# Patient Record
Sex: FEMALE | Race: ASIAN | NOT HISPANIC OR LATINO | Employment: FULL TIME | URBAN - METROPOLITAN AREA
[De-identification: names, ages, dates, MRNs, and addresses within clinical notes are randomized per-mention and may not be internally consistent; named-entity substitution may affect disease eponyms.]

---

## 2021-06-10 ENCOUNTER — HOSPITAL ENCOUNTER (OUTPATIENT)
Dept: RADIOLOGY | Facility: MEDICAL CENTER | Age: 46
End: 2021-06-10
Attending: MIDWIFE
Payer: COMMERCIAL

## 2021-06-10 DIAGNOSIS — Z34.82 PRENATAL CARE, SUBSEQUENT PREGNANCY, SECOND TRIMESTER: ICD-10-CM

## 2021-06-10 PROCEDURE — 76805 OB US >/= 14 WKS SNGL FETUS: CPT

## 2022-04-24 ENCOUNTER — OFFICE VISIT (OUTPATIENT)
Dept: URGENT CARE | Facility: PHYSICIAN GROUP | Age: 47
End: 2022-04-24
Payer: COMMERCIAL

## 2022-04-24 VITALS
WEIGHT: 129.2 LBS | HEIGHT: 61 IN | DIASTOLIC BLOOD PRESSURE: 88 MMHG | HEART RATE: 106 BPM | SYSTOLIC BLOOD PRESSURE: 140 MMHG | TEMPERATURE: 98.9 F | OXYGEN SATURATION: 99 % | RESPIRATION RATE: 16 BRPM | BODY MASS INDEX: 24.39 KG/M2

## 2022-04-24 DIAGNOSIS — L21.9 SEBORRHEIC DERMATITIS: ICD-10-CM

## 2022-04-24 DIAGNOSIS — L08.9 INFECTION OF SCALP: ICD-10-CM

## 2022-04-24 PROCEDURE — 99203 OFFICE O/P NEW LOW 30 MIN: CPT | Performed by: PHYSICIAN ASSISTANT

## 2022-04-24 RX ORDER — MAGNESIUM GLYCINATE 100 MG
TABLET ORAL
COMMUNITY

## 2022-04-24 RX ORDER — SULFAMETHOXAZOLE AND TRIMETHOPRIM 800; 160 MG/1; MG/1
1 TABLET ORAL 2 TIMES DAILY
Qty: 10 TABLET | Refills: 0 | Status: SHIPPED | OUTPATIENT
Start: 2022-04-24 | End: 2022-04-29

## 2022-04-24 RX ORDER — VITAMIN E (DL,TOCOPHERYL ACET) 22.5 MG/ML
DROPS ORAL
COMMUNITY
End: 2022-05-28

## 2022-04-24 ASSESSMENT — ENCOUNTER SYMPTOMS
VOMITING: 0
NAUSEA: 0
FEVER: 0
DIZZINESS: 0
CHILLS: 0
HEADACHES: 0

## 2022-04-24 NOTE — PATIENT INSTRUCTIONS
Seborrheic Dermatitis, Adult  Seborrheic dermatitis is a skin disease that causes red, scaly patches. It usually occurs on the scalp, and it is often called dandruff. The patches may appear on other parts of the body. Skin patches tend to appear where there are many oil glands in the skin. Areas of the body that are commonly affected include:  · Scalp.  · Skin folds of the body.  · Ears.  · Eyebrows.  · Neck.  · Face.  · Armpits.  · The bearded area of men's faces.  The condition may come and go for no known reason, and it is often long-lasting (chronic).  What are the causes?  The cause of this condition is not known.  What increases the risk?  This condition is more likely to develop in people who:  · Have certain conditions, such as:  ? HIV (human immunodeficiency virus).  ? AIDS (acquired immunodeficiency syndrome).  ? Parkinson disease.  ? Mood disorders, such as depression.  · Are 40-60 years old.  What are the signs or symptoms?  Symptoms of this condition include:  · Thick scales on the scalp.  · Redness on the face or in the armpits.  · Skin that is flaky. The flakes may be white or yellow.  · Skin that seems oily or dry but is not helped with moisturizers.  · Itching or burning in the affected areas.  How is this diagnosed?  This condition is diagnosed with a medical history and physical exam. A sample of your skin may be tested (skin biopsy). You may need to see a skin specialist (dermatologist).  How is this treated?  There is no cure for this condition, but treatment can help to manage the symptoms. You may get treatment to remove scales, lower the risk of skin infection, and reduce swelling or itching. Treatment may include:  · Creams that reduce swelling and irritation (steroids).  · Creams that reduce skin yeast.  · Medicated shampoo, soaps, moisturizing creams, or ointments.  · Medicated moisturizing creams or ointments.  Follow these instructions at home:  · Apply over-the-counter and prescription  medicines only as told by your health care provider.  · Use any medicated shampoo, soaps, skin creams, or ointments only as told by your health care provider.  · Keep all follow-up visits as told by your health care provider. This is important.  Contact a health care provider if:  · Your symptoms do not improve with treatment.  · Your symptoms get worse.  · You have new symptoms.  This information is not intended to replace advice given to you by your health care provider. Make sure you discuss any questions you have with your health care provider.  Document Released: 12/18/2006 Document Revised: 11/30/2018 Document Reviewed: 04/06/2017  Elsevier Patient Education © 2020 [employer] Inc[employer]

## 2022-05-28 ENCOUNTER — OFFICE VISIT (OUTPATIENT)
Dept: URGENT CARE | Facility: PHYSICIAN GROUP | Age: 47
End: 2022-05-28
Payer: COMMERCIAL

## 2022-05-28 VITALS
WEIGHT: 129 LBS | SYSTOLIC BLOOD PRESSURE: 122 MMHG | RESPIRATION RATE: 12 BRPM | TEMPERATURE: 97.7 F | HEIGHT: 61 IN | DIASTOLIC BLOOD PRESSURE: 80 MMHG | OXYGEN SATURATION: 98 % | BODY MASS INDEX: 24.35 KG/M2 | HEART RATE: 87 BPM

## 2022-05-28 DIAGNOSIS — M77.8 TENDINITIS OF EXTENSOR TENDON OF RIGHT HAND: ICD-10-CM

## 2022-05-28 PROCEDURE — 99214 OFFICE O/P EST MOD 30 MIN: CPT | Performed by: PHYSICIAN ASSISTANT

## 2022-05-28 RX ORDER — PREDNISONE 10 MG/1
40 TABLET ORAL DAILY
Qty: 20 TABLET | Refills: 0 | Status: SHIPPED | OUTPATIENT
Start: 2022-05-28 | End: 2022-06-02

## 2022-05-28 ASSESSMENT — ENCOUNTER SYMPTOMS
NEUROLOGICAL NEGATIVE: 1
CONSTITUTIONAL NEGATIVE: 1

## 2022-05-28 NOTE — PROGRESS NOTES
"  Subjective:     Radha Marin  is a 46 y.o. female who presents for Finger Pain (R pointer , x 2 weeks, swollen knuckle, no injury )       She presents today with right first finger PIP joint pain has been ongoing for 2 weeks.  The symptoms started after she had been performing gardening activities.  She denies any specific injury or trauma to the area.  Denies any known puncture injury to the finger.  Currently experiencing some pain with gripping activities, this pain is present over the proximal phalanx.  There is swelling over the first PIP joint.  Denies numbness and tingling over the affected finger.  She has trialed 400 mg of ibuprofen x4 doses, without symptom improvement.  She is currently breast-feeding.       Review of Systems   Constitutional: Negative.    Musculoskeletal: Positive for joint pain.   Neurological: Negative.       Allergies   Allergen Reactions   • Amoxicillin Hives   • Penicillin G Hives     No past medical history on file.     Objective:   /80 (BP Location: Right arm, Patient Position: Sitting, BP Cuff Size: Adult)   Pulse 87   Temp 36.5 °C (97.7 °F) (Temporal)   Resp 12   Ht 1.549 m (5' 1\")   Wt 58.5 kg (129 lb)   SpO2 98%   BMI 24.37 kg/m²   Physical Exam  Vitals and nursing note reviewed.   Constitutional:       General: She is not in acute distress.     Appearance: She is not ill-appearing or toxic-appearing.   HENT:      Head: Normocephalic.      Nose: No rhinorrhea.   Eyes:      General: Scleral icterus present.      Conjunctiva/sclera: Conjunctivae normal.   Pulmonary:      Effort: Pulmonary effort is normal. No respiratory distress.      Breath sounds: No stridor.   Musculoskeletal:      Cervical back: Neck supple.      Comments: Mild tenderness to palpation over the right first PIP joint, right first proximal phalanx.  There is some pain with gripping of the right hand.  5 out of 5 strength of the hands, bilaterally.  Neurovascular sensation is intact.  Capillary " refill within normal limits over all digits.   Neurological:      Mental Status: She is alert and oriented to person, place, and time.   Psychiatric:         Mood and Affect: Mood normal.         Behavior: Behavior normal.         Thought Content: Thought content normal.         Judgment: Judgment normal.             Diagnostic testing: None    Assessment/Plan:     Encounter Diagnoses   Name Primary?   • Tendinitis of extensor tendon of right hand           Plan for care for today's complaint includes Over-the-counter medications and prednisone.  Due to patient breast-feeding this did affect the management strategy for this condition and limited the medications that could be prescribed to treat this problem.  I did instruct her on how to perform cross friction massage over the extensor tendon, this can be performed 3-4 times a day.  I did offer referral to sports medicine; however, the patient would like to hold off at this time.  If symptoms remain ongoing or worsen then she can return to the urgent care or follow-up with her primary care provider for reevaluation..  I did review [facility] website to ensure that prednisone is appropriate to be taken during breast-feeding.  Prescription for Prednisone. provided.    See AVS Instructions below for written guidance provided to patient on after-visit management and care in addition to our verbal discussion during the visit.    Please note that this dictation was created using voice recognition software. I have attempted to correct all errors, but there may be sound-alike, spelling, grammar and possibly content errors that I did not discover before finalizing the note.    Clarke Heard PA-C

## 2022-06-22 SDOH — HEALTH STABILITY: MENTAL HEALTH
STRESS IS WHEN SOMEONE FEELS TENSE, NERVOUS, ANXIOUS, OR CAN'T SLEEP AT NIGHT BECAUSE THEIR MIND IS TROUBLED. HOW STRESSED ARE YOU?: ONLY A LITTLE

## 2022-06-22 SDOH — ECONOMIC STABILITY: TRANSPORTATION INSECURITY
IN THE PAST 12 MONTHS, HAS LACK OF TRANSPORTATION KEPT YOU FROM MEETINGS, WORK, OR FROM GETTING THINGS NEEDED FOR DAILY LIVING?: NO

## 2022-06-22 SDOH — ECONOMIC STABILITY: HOUSING INSECURITY
IN THE LAST 12 MONTHS, WAS THERE A TIME WHEN YOU DID NOT HAVE A STEADY PLACE TO SLEEP OR SLEPT IN A SHELTER (INCLUDING NOW)?: NO

## 2022-06-22 SDOH — ECONOMIC STABILITY: HOUSING INSECURITY: IN THE LAST 12 MONTHS, HOW MANY PLACES HAVE YOU LIVED?: 1

## 2022-06-22 SDOH — ECONOMIC STABILITY: INCOME INSECURITY: IN THE LAST 12 MONTHS, WAS THERE A TIME WHEN YOU WERE NOT ABLE TO PAY THE MORTGAGE OR RENT ON TIME?: NO

## 2022-06-22 SDOH — ECONOMIC STABILITY: TRANSPORTATION INSECURITY
IN THE PAST 12 MONTHS, HAS THE LACK OF TRANSPORTATION KEPT YOU FROM MEDICAL APPOINTMENTS OR FROM GETTING MEDICATIONS?: NO

## 2022-06-22 SDOH — ECONOMIC STABILITY: FOOD INSECURITY: WITHIN THE PAST 12 MONTHS, YOU WORRIED THAT YOUR FOOD WOULD RUN OUT BEFORE YOU GOT MONEY TO BUY MORE.: NEVER TRUE

## 2022-06-22 SDOH — ECONOMIC STABILITY: INCOME INSECURITY: HOW HARD IS IT FOR YOU TO PAY FOR THE VERY BASICS LIKE FOOD, HOUSING, MEDICAL CARE, AND HEATING?: NOT HARD AT ALL

## 2022-06-22 SDOH — ECONOMIC STABILITY: TRANSPORTATION INSECURITY
IN THE PAST 12 MONTHS, HAS LACK OF RELIABLE TRANSPORTATION KEPT YOU FROM MEDICAL APPOINTMENTS, MEETINGS, WORK OR FROM GETTING THINGS NEEDED FOR DAILY LIVING?: NO

## 2022-06-22 SDOH — ECONOMIC STABILITY: FOOD INSECURITY: WITHIN THE PAST 12 MONTHS, THE FOOD YOU BOUGHT JUST DIDN'T LAST AND YOU DIDN'T HAVE MONEY TO GET MORE.: NEVER TRUE

## 2022-06-22 SDOH — HEALTH STABILITY: PHYSICAL HEALTH: ON AVERAGE, HOW MANY DAYS PER WEEK DO YOU ENGAGE IN MODERATE TO STRENUOUS EXERCISE (LIKE A BRISK WALK)?: 1 DAY

## 2022-06-22 SDOH — HEALTH STABILITY: PHYSICAL HEALTH: ON AVERAGE, HOW MANY MINUTES DO YOU ENGAGE IN EXERCISE AT THIS LEVEL?: 60 MIN

## 2022-06-22 ASSESSMENT — LIFESTYLE VARIABLES
HOW OFTEN DO YOU HAVE A DRINK CONTAINING ALCOHOL: 2-3 TIMES A WEEK
HOW OFTEN DO YOU HAVE SIX OR MORE DRINKS ON ONE OCCASION: NEVER
AUDIT-C TOTAL SCORE: 3
SKIP TO QUESTIONS 9-10: 1
HOW MANY STANDARD DRINKS CONTAINING ALCOHOL DO YOU HAVE ON A TYPICAL DAY: 1 OR 2

## 2022-06-22 ASSESSMENT — SOCIAL DETERMINANTS OF HEALTH (SDOH)
DO YOU BELONG TO ANY CLUBS OR ORGANIZATIONS SUCH AS CHURCH GROUPS UNIONS, FRATERNAL OR ATHLETIC GROUPS, OR SCHOOL GROUPS?: YES
HOW OFTEN DO YOU GET TOGETHER WITH FRIENDS OR RELATIVES?: ONCE A WEEK
HOW OFTEN DO YOU GET TOGETHER WITH FRIENDS OR RELATIVES?: ONCE A WEEK
WITHIN THE PAST 12 MONTHS, YOU WORRIED THAT YOUR FOOD WOULD RUN OUT BEFORE YOU GOT THE MONEY TO BUY MORE: NEVER TRUE
HOW OFTEN DO YOU ATTENT MEETINGS OF THE CLUB OR ORGANIZATION YOU BELONG TO?: MORE THAN 4 TIMES PER YEAR
HOW OFTEN DO YOU ATTENT MEETINGS OF THE CLUB OR ORGANIZATION YOU BELONG TO?: MORE THAN 4 TIMES PER YEAR
HOW OFTEN DO YOU HAVE SIX OR MORE DRINKS ON ONE OCCASION: NEVER
HOW OFTEN DO YOU ATTEND CHURCH OR RELIGIOUS SERVICES?: NEVER
HOW OFTEN DO YOU ATTEND CHURCH OR RELIGIOUS SERVICES?: NEVER
IN A TYPICAL WEEK, HOW MANY TIMES DO YOU TALK ON THE PHONE WITH FAMILY, FRIENDS, OR NEIGHBORS?: NEVER
HOW OFTEN DO YOU HAVE A DRINK CONTAINING ALCOHOL: 2-3 TIMES A WEEK
HOW HARD IS IT FOR YOU TO PAY FOR THE VERY BASICS LIKE FOOD, HOUSING, MEDICAL CARE, AND HEATING?: NOT HARD AT ALL
HOW MANY DRINKS CONTAINING ALCOHOL DO YOU HAVE ON A TYPICAL DAY WHEN YOU ARE DRINKING: 1 OR 2
DO YOU BELONG TO ANY CLUBS OR ORGANIZATIONS SUCH AS CHURCH GROUPS UNIONS, FRATERNAL OR ATHLETIC GROUPS, OR SCHOOL GROUPS?: YES
IN A TYPICAL WEEK, HOW MANY TIMES DO YOU TALK ON THE PHONE WITH FAMILY, FRIENDS, OR NEIGHBORS?: NEVER

## 2022-06-23 ENCOUNTER — OFFICE VISIT (OUTPATIENT)
Dept: MEDICAL GROUP | Facility: PHYSICIAN GROUP | Age: 47
End: 2022-06-23
Payer: COMMERCIAL

## 2022-06-23 VITALS
TEMPERATURE: 97.6 F | SYSTOLIC BLOOD PRESSURE: 120 MMHG | OXYGEN SATURATION: 97 % | BODY MASS INDEX: 24.77 KG/M2 | DIASTOLIC BLOOD PRESSURE: 68 MMHG | HEART RATE: 91 BPM | HEIGHT: 61 IN | WEIGHT: 131.2 LBS

## 2022-06-23 DIAGNOSIS — Z00.00 ENCOUNTER FOR MEDICAL EXAMINATION TO ESTABLISH CARE: ICD-10-CM

## 2022-06-23 DIAGNOSIS — L30.8 OTHER ECZEMA: ICD-10-CM

## 2022-06-23 DIAGNOSIS — Z12.31 ENCOUNTER FOR SCREENING MAMMOGRAM FOR BREAST CANCER: ICD-10-CM

## 2022-06-23 DIAGNOSIS — B00.9 HSV-2 (HERPES SIMPLEX VIRUS 2) INFECTION: ICD-10-CM

## 2022-06-23 PROBLEM — L30.9 ECZEMA: Status: ACTIVE | Noted: 2022-06-23

## 2022-06-23 PROCEDURE — 99213 OFFICE O/P EST LOW 20 MIN: CPT

## 2022-06-23 RX ORDER — ACYCLOVIR 400 MG/1
400 TABLET ORAL 2 TIMES DAILY
Qty: 10 TABLET | Refills: 0 | Status: SHIPPED | OUTPATIENT
Start: 2022-06-23

## 2022-06-23 ASSESSMENT — PATIENT HEALTH QUESTIONNAIRE - PHQ9: CLINICAL INTERPRETATION OF PHQ2 SCORE: 0

## 2022-06-23 NOTE — ASSESSMENT & PLAN NOTE
Chronic, controlled with medication.  Flare ups occur on neck, shoulders, arms. Occasinally legs.  She gets good relief from fluocinonide cream. Refill requested today. Refill provided.

## 2022-06-23 NOTE — ASSESSMENT & PLAN NOTE
Chronic, controlled.  She can't recall the last time she had a flare up but sometimes has pain and is interested in getting a prescription in case she does have a flare up.  Mid-wife prescribed acyclovir in the past. Refill provided today for use as needed.

## 2022-06-23 NOTE — PROGRESS NOTES
CC:   Chief Complaint   Patient presents with   • Establish Care       HPI:  Radha is a pleasant 46 y.o. female here today to establish care.  Her previous PCP is Sheridan Salamanca from [facility].  Patient does not routinely follow with primary care.  She has received medications for eczema and HSV-2 from her midwife.  She is here today to complete prep healthcare maintenance screenings and get refills for her chronic conditions.    Patient Active Problem List   Diagnosis   • Eczema   • HSV-2 (herpes simplex virus 2) infection       Current Outpatient Medications   Medication Sig Dispense Refill   • LECITHIN PO Take  by mouth.     • FENUGREEK PO Take  by mouth.     • acyclovir (ZOVIRAX) 400 MG tablet Take 1 Tablet by mouth 2 times a day. 10 Tablet 0   • fluocinonide (LIDEX) 0.05 % Cream Apply 1 Application topically 2 times a day. 15 g 1   • vitamin D (VITAMIND D3) 1000 UNIT Tab Take 1,000 Units by mouth every day.     • Magnesium Bisglycinate (MAG GLYCINATE) 100 MG Tab Take  by mouth.       No current facility-administered medications for this visit.       Allergies as of 06/23/2022 - Reviewed 06/23/2022   Allergen Reaction Noted   • Amoxicillin Hives 02/18/2015   • Penicillin g Hives 01/13/2014        Social History     Socioeconomic History   • Marital status:      Spouse name: Not on file   • Number of children: Not on file   • Years of education: Not on file   • Highest education level: Bachelor's degree (e.g., BA, AB, BS)   Occupational History   • Not on file   Tobacco Use   • Smoking status: Never Smoker   • Smokeless tobacco: Never Used   Vaping Use   • Vaping Use: Never used   Substance and Sexual Activity   • Alcohol use: Yes     Alcohol/week: 1.2 oz     Types: 1 Glasses of wine, 1 Cans of beer per week   • Drug use: Never   • Sexual activity: Not on file   Other Topics Concern   • Not on file   Social History Narrative   • Not on file     Social Determinants of Health     Financial  Resource Strain: Low Risk    • Difficulty of Paying Living Expenses: Not hard at all   Food Insecurity: No Food Insecurity   • Worried About Running Out of Food in the Last Year: Never true   • Ran Out of Food in the Last Year: Never true   Transportation Needs: No Transportation Needs   • Lack of Transportation (Medical): No   • Lack of Transportation (Non-Medical): No   Physical Activity: Insufficiently Active   • Days of Exercise per Week: 1 day   • Minutes of Exercise per Session: 60 min   Stress: No Stress Concern Present   • Feeling of Stress : Only a little   Social Connections: Moderately Isolated   • Frequency of Communication with Friends and Family: Never   • Frequency of Social Gatherings with Friends and Family: Once a week   • Attends Congregational Services: Never   • Active Member of Clubs or Organizations: Yes   • Attends Club or Organization Meetings: More than 4 times per year   • Marital Status:    Intimate Partner Violence: Not on file   Housing Stability: Low Risk    • Unable to Pay for Housing in the Last Year: No   • Number of Places Lived in the Last Year: 1   • Unstable Housing in the Last Year: No       No family history on file.    No past medical history on file.     No past surgical history on file.    Labs: No labs on file to review.    ROS:  Gen: no fevers/chills, no changes in weight  Eyes: no changes in vision  ENT: no sore throat, no hearing loss, no bloody nose  Pulm: no sob, no cough  CV: no chest pain, no palpitations  GI: no nausea/vomiting, no diarrhea  : no dysuria  MSk: no myalgias  Skin: no rash  Neuro: no headaches, no numbness/tingling  Heme/Lymph: no easy bruising      Exam:   Vitals:    06/23/22 1233   BP: 120/68   Pulse: 91   Temp: 36.4 °C (97.6 °F)   SpO2: 97%     Body mass index is 24.79 kg/m².    General: Normal appearing. No distress.  Head: Normocephalic.  Atraumatic.  Eyes: conjunctiva clear, pupils equal and reactive to light accommodation,  Ears: normal  shape and contour, canals are clear bilaterally, tympanic membranes are benign  Throat: Oropharynx is without erythema, edema or exudates.   Neck: Supple without JVD or bruit.Thyroid is not enlarged.  Pulmonary: Clear to ausculation.  Normal effort. No rales, ronchi, or wheezing.  Cardiovascular: Regular rate and rhythm without murmur. Carotid and radial pulses are intact and equal bilaterally.  Pedal pulses within normal limits.  Abdomen: Soft, nontender, nondistended.   Neurologic: Grossly intact.  Sensation intact.  Lymph: No cervical or supraclavicular lymph nodes are palpable.  Skin: Warm and dry.  No obvious lesions.  Musculoskeletal: No extremity cyanosis, clubbing, or edema.  Strength 5+ and equal bilaterally in all extremities.  Psych: Normal mood and affect. Alert and oriented x3. Judgment and insight is normal.      Assessment and Plan.   46 y.o. female presenting with the following.     1. Encounter for medical examination to establish care  Health conditions and medications reviewed and updated. All screenings discussed and up-to-date. Health maintenance completed.     - Lipid Profile; Future  - Comp Metabolic Panel; Future  - CBC WITH DIFFERENTIAL; Future  - VITAMIN D,25 HYDROXY; Future  - TSH WITH REFLEX TO FT4; Future    2. HSV-2 (herpes simplex virus 2) infection  Chronic, controlled.  She can't recall the last time she had a flare up but sometimes has pain and is interested in getting a prescription in case she does have a flare up.  Mid-wife prescribed acyclovir in the past. Refill provided today for use as needed.    - acyclovir (ZOVIRAX) 400 MG tablet; Take 1 Tablet by mouth 2 times a day.  Dispense: 10 Tablet; Refill: 0    3. Other eczema  Chronic, controlled with medication.  Flare ups occur on neck, shoulders, arms. Occasinally legs.  She gets good relief from fluocinonide cream. Refill requested today. Refill provided.   - fluocinonide (LIDEX) 0.05 % Cream; Apply 1 Application topically 2  times a day.  Dispense: 15 g; Refill: 1    4. Encounter for screening mammogram for breast cancer  - MA-SCREENING MAMMO BILAT W/TOMOSYNTHESIS W/CAD; Future    Educated in proper administration of medication(s) ordered today including safety, possible SE, risks, benefits, rationale and alternatives to therapy.   Supportive care, differential diagnoses, and indications for immediate follow-up discussed with patient.    Pathogenesis of diagnosis discussed including typical length and natural progression.    Instructed to return to clinic or nearest emergency department for any change in condition, further concerns, or worsening of symptoms.  Patient states understanding of the plan of care and discharge instructions.    Return for Women[facility]ness and Pap.    I have placed the above orders and discussed them with an approved delegating provider.  The MA is performing the below orders under the direction of Dr. Vega.    Please note that this dictation was created using voice recognition software. I have worked with consultants from the vendor as well as technical experts from [facility] [employer] to optimize the interface. I have made every reasonable attempt to correct obvious errors, but I expect that there are errors of grammar and possibly content that I did not discover before finalizing the note.

## 2023-04-27 ENCOUNTER — OFFICE VISIT (OUTPATIENT)
Dept: MEDICAL GROUP | Facility: PHYSICIAN GROUP | Age: 48
End: 2023-04-27
Payer: COMMERCIAL

## 2023-04-27 VITALS
TEMPERATURE: 98.8 F | SYSTOLIC BLOOD PRESSURE: 114 MMHG | OXYGEN SATURATION: 98 % | DIASTOLIC BLOOD PRESSURE: 70 MMHG | HEIGHT: 61 IN | HEART RATE: 93 BPM | BODY MASS INDEX: 25.68 KG/M2 | WEIGHT: 136 LBS

## 2023-04-27 DIAGNOSIS — N64.4 BREAST PAIN: ICD-10-CM

## 2023-04-27 DIAGNOSIS — R07.9 CHEST PAIN, UNSPECIFIED TYPE: ICD-10-CM

## 2023-04-27 PROCEDURE — 99214 OFFICE O/P EST MOD 30 MIN: CPT

## 2023-04-27 ASSESSMENT — PATIENT HEALTH QUESTIONNAIRE - PHQ9: CLINICAL INTERPRETATION OF PHQ2 SCORE: 0

## 2023-04-27 NOTE — PROGRESS NOTES
"Subjective:     CC:   Chief Complaint   Patient presents with    Chest Pain     R side chest pain x2 months        HISTORY OF THE PRESENT ILLNESS: Radha is a pleasant 47 y.o. female here today to for right-sided chest pain that seems to be positional with movements and coughing.  Patient states the pain is localized to her upper right breast around 2 o'clock. Patient states she has noticed the pain come and go over the last 2 months.  Pain is reproducible with position and coughing.  It is not exacerbated by her respirations.  She denies shortness of breath or reflux symptoms.    Patient states she does do occasional weight lifting and is not sure if she pulled a muscle but after it has not gone away over the last 2 months, she wanted to make sure there were no concerns.  She additionally breast-feeds with a recurrent history of mastitis.  She just stopped breast-feeding about a week ago but is not sure if there is a relation to this.    Patient had a difficult time explaining the pain.  She thinks it could be similar to muscle pain.  She says it is just a sharp shooting pain that does not sustain.    ROS:  All systems negative expect as addressed in assessment and plan.     Objective:     Exam:  /70 (BP Location: Left arm, Patient Position: Sitting, BP Cuff Size: Adult)   Pulse 93   Temp 37.1 °C (98.8 °F) (Temporal)   Ht 1.549 m (5' 1\")   Wt 61.7 kg (136 lb)   SpO2 98%   BMI 25.70 kg/m²  Body mass index is 25.7 kg/m².    Physical Exam  Chest:      Comments: +Dense breast tissue      Assessment & Plan:     47 y.o. female with the following -    1. Chest pain, unspecified type  2. Breast pain  This is a new problem.  I did perform a breast exam and there was no tenderness with palpation. I did palpate what felt like dense breast tissue that was worse on the right than the left.  She is due for a mammogram.  I will place orders for diagnostic mammogram on the right and ultrasound to ensure no underlying " pathologic etiologies.  I do not feel she is at risk of a clot as her PERC score is 0.  I do not feel a EKG is necessary at this time as patient's pain has come and gone and are provoked by positioning over the last 2 months.   Likely muscle related.  Possibly costochondritis secondary to patient's weight lifting.  Encouraged NSAIDs.  We will additionally order diclofenac gel.  - diclofenac sodium (VOLTAREN) 1 % Gel; Apply 2 g topically 4 times a day as needed (pain).  Dispense: 100 g; Refill: 0  - US-BREAST LIMITED-RIGHT; Future  - MA DIAGNOSTIC MAMMO RIGHT W/CAD; Future    Patient was educated in proper administration of medication(s) ordered today including safety, possible SE, risks, benefits, rationale and alternatives to therapy.   Supportive care, differential diagnoses, and indications for immediate follow-up discussed with patient.    Pathogenesis of diagnosis discussed including typical length and natural progression.    Instructed to return to clinic or nearest emergency department for any change in condition, further concerns, or worsening of symptoms.  Patient states understanding of the plan of care and discharge instructions.    Return if symptoms worsen or fail to improve.    I spent a total of 31 minutes with record review, exam, and communication with the patient, communication with other providers, and documentation of this encounter. This does not include time spent on separately billable procedures/tests.    I have placed the above orders and discussed them with an approved delegating provider.  The MA is performing the below orders under the direction of Dr. Vega.    Please note that this dictation was created using voice recognition software. I have worked with consultants from the vendor as well as technical experts from [employer] [employer] to optimize the interface. I have made every reasonable attempt to correct obvious errors, but I expect that there are errors of grammar and possibly content  that I did not discover before finalizing the note.

## 2023-05-02 ENCOUNTER — HOSPITAL ENCOUNTER (OUTPATIENT)
Dept: RADIOLOGY | Facility: MEDICAL CENTER | Age: 48
End: 2023-05-02
Payer: COMMERCIAL

## 2023-05-02 DIAGNOSIS — N64.4 BREAST PAIN: ICD-10-CM

## 2023-05-02 PROCEDURE — G0279 TOMOSYNTHESIS, MAMMO: HCPCS

## 2023-05-02 PROCEDURE — 76642 ULTRASOUND BREAST LIMITED: CPT | Mod: RT

## 2023-06-07 ENCOUNTER — OFFICE VISIT (OUTPATIENT)
Dept: MEDICAL GROUP | Facility: PHYSICIAN GROUP | Age: 48
End: 2023-06-07

## 2023-06-07 VITALS
WEIGHT: 136 LBS | TEMPERATURE: 98.2 F | HEART RATE: 105 BPM | OXYGEN SATURATION: 99 % | SYSTOLIC BLOOD PRESSURE: 116 MMHG | HEIGHT: 61 IN | BODY MASS INDEX: 25.68 KG/M2 | DIASTOLIC BLOOD PRESSURE: 72 MMHG

## 2023-06-07 DIAGNOSIS — N63.10 MASS OF RIGHT BREAST, UNSPECIFIED QUADRANT: ICD-10-CM

## 2023-06-07 PROCEDURE — 99213 OFFICE O/P EST LOW 20 MIN: CPT

## 2023-06-07 PROCEDURE — 3078F DIAST BP <80 MM HG: CPT

## 2023-06-07 PROCEDURE — 3074F SYST BP LT 130 MM HG: CPT

## 2023-06-07 NOTE — PROGRESS NOTES
"Subjective:     CC:   Chief Complaint   Patient presents with    Bump     R breast x1 week        HISTORY OF THE PRESENT ILLNESS: Radha is a pleasant 47 y.o. female here today with concerns of a lump at approximately 530 on her right breast.  Patient originally presented on 4/27/2023 with breast pain.  She did have an ultrasound done which showed a potential cyst.  She returns today with a new lump in the same region as the cyst that was detected on the ultrasound, however, it is slightly lower.  She states the lump does have some pain with palpation.  There is no redness.  She has not been breast-feeding since January.  It is not warm to touch.  Systemically, she has no associated symptoms.  No fever, she denies chills or fatigue.    She is inquiring about antibiotics as she thinks it could be a clogged pore or duct.    ROS:  All systems negative expect as addressed in assessment and plan.     Objective:     Exam:  /72 (BP Location: Right arm, Patient Position: Sitting, BP Cuff Size: Adult)   Pulse (!) 105   Temp 36.8 °C (98.2 °F) (Temporal)   Ht 1.549 m (5' 1\")   Wt 61.7 kg (136 lb)   SpO2 99%   BMI 25.70 kg/m²  Body mass index is 25.7 kg/m².    Physical Exam  Chest:             Assessment & Plan:     47 y.o. female with the following -    1. Mass of right breast, unspecified quadrant  Upon exam, there is a palpable lump on the inner quadrant at approximately 5:30 of the right breast.  Patient states there is some pain with palpation.  It does not appear to be infected as it is not red.  There is no drainage.  It is not warm to touch.  I do not feel antibiotics are warranted at this time.  I do question if it is an abscess, however, mastitis is not likely as patient has not been breast-feeding for approximately 6 months.  I do recommend another ultrasound to be done.  Patient is inquiring about drainage of the abscess.  I explained this should not be done in the primary care setting.  Referral placed " for patient to see general surgery for drainage if this is found to be an abscess.  Patient has a ultrasound scheduled for further examination in 2 weeks.  - US-BREAST LIMITED-RIGHT; Future    Patient was educated in proper administration of medication(s) ordered today including safety, possible SE, risks, benefits, rationale and alternatives to therapy.   Supportive care, differential diagnoses, and indications for immediate follow-up discussed with patient.    Pathogenesis of diagnosis discussed including typical length and natural progression.    Instructed to return to clinic or nearest emergency department for any change in condition, further concerns, or worsening of symptoms.  Patient states understanding of the plan of care and discharge instructions.    Return if symptoms worsen or fail to improve.    I spent a total of 20 minutes with record review, exam, and communication with the patient, communication with other providers, and documentation of this encounter. This does not include time spent on separately billable procedures/tests.    I have placed the above orders and discussed them with an approved delegating provider.  The MA is performing the below orders under the direction of Dr. Vega.    Please note that this dictation was created using voice recognition software. I have worked with consultants from the vendor as well as technical experts from [facility] to optimize the interface. I have made every reasonable attempt to correct obvious errors, but I expect that there are errors of grammar and possibly content that I did not discover before finalizing the note.

## 2023-06-12 DIAGNOSIS — N61.1 ABSCESS OF FEMALE BREAST: ICD-10-CM

## 2023-06-20 ENCOUNTER — HOSPITAL ENCOUNTER (OUTPATIENT)
Dept: RADIOLOGY | Facility: MEDICAL CENTER | Age: 48
End: 2023-06-20
Payer: COMMERCIAL

## 2023-06-20 DIAGNOSIS — N63.10 MASS OF RIGHT BREAST, UNSPECIFIED QUADRANT: ICD-10-CM

## 2023-06-20 DIAGNOSIS — N60.01 CYST OF RIGHT BREAST: ICD-10-CM

## 2023-06-20 PROCEDURE — 76642 ULTRASOUND BREAST LIMITED: CPT | Mod: RT

## 2023-06-20 RX ORDER — SULFAMETHOXAZOLE AND TRIMETHOPRIM 800; 160 MG/1; MG/1
1 TABLET ORAL 2 TIMES DAILY
Qty: 10 TABLET | Refills: 0 | Status: SHIPPED | OUTPATIENT
Start: 2023-06-20 | End: 2023-06-25

## 2023-09-27 ENCOUNTER — DOCUMENTATION (OUTPATIENT)
Dept: HEALTH INFORMATION MANAGEMENT | Facility: OTHER | Age: 48
End: 2023-09-27
Payer: COMMERCIAL